# Patient Record
Sex: FEMALE | NOT HISPANIC OR LATINO | URBAN - METROPOLITAN AREA
[De-identification: names, ages, dates, MRNs, and addresses within clinical notes are randomized per-mention and may not be internally consistent; named-entity substitution may affect disease eponyms.]

---

## 2019-05-24 ENCOUNTER — OFFICE VISIT (OUTPATIENT)
Dept: URBAN - METROPOLITAN AREA CLINIC 11 | Facility: CLINIC | Age: 69
End: 2019-05-24
Payer: MEDICARE

## 2019-05-24 DIAGNOSIS — H43.313 VITREOUS MEMBRANES AND STRANDS, BILATERAL: ICD-10-CM

## 2019-05-24 DIAGNOSIS — H40.013 OPEN ANGLE WITH BORDERLINE FINDINGS, LOW RISK, BILATERAL: ICD-10-CM

## 2019-05-24 PROCEDURE — 92014 COMPRE OPH EXAM EST PT 1/>: CPT | Performed by: OPTOMETRIST

## 2019-05-24 ASSESSMENT — INTRAOCULAR PRESSURE
OD: 16
OS: 16

## 2019-05-24 NOTE — IMPRESSION/PLAN
Impression: Age-related nuclear cataract, bilateral: H25.13. OU. Plan: Cataracts account for the patient's complaints. Discussed options, surgery or spectacle change. Explained surgery risks, benefits, procedures and recovery.   Patient defers surgery and elects to change glasses first.

## 2019-05-24 NOTE — IMPRESSION/PLAN
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 Plan: OAG suspect d/t large c/d ratio. IOP normal today. Return for baseline testing 3-4 months , including HVF 24-2, OCT-RNFL and pachymetry. No treatment at this time.

## 2019-09-06 ENCOUNTER — TESTING ONLY (OUTPATIENT)
Dept: URBAN - METROPOLITAN AREA CLINIC 11 | Facility: CLINIC | Age: 69
End: 2019-09-06
Payer: MEDICARE

## 2019-09-06 PROCEDURE — 92083 EXTENDED VISUAL FIELD XM: CPT | Performed by: OPTOMETRIST

## 2019-09-13 ENCOUNTER — OFFICE VISIT (OUTPATIENT)
Dept: URBAN - METROPOLITAN AREA CLINIC 11 | Facility: CLINIC | Age: 69
End: 2019-09-13
Payer: MEDICARE

## 2019-09-13 PROCEDURE — 92133 CPTRZD OPH DX IMG PST SGM ON: CPT | Performed by: OPTOMETRIST

## 2019-09-13 PROCEDURE — 99213 OFFICE O/P EST LOW 20 MIN: CPT | Performed by: OPTOMETRIST

## 2019-09-13 ASSESSMENT — INTRAOCULAR PRESSURE
OS: 14
OD: 15

## 2019-09-13 NOTE — IMPRESSION/PLAN
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
HVF 24-2: full field OU
OCT RNFL: large cup, RNFL WNL OU Plan: Reviewed results of testing w/pt. Continue to monitor without medication at this time. 
RTC 6 months x dilated exam.

## 2020-03-03 ENCOUNTER — OFFICE VISIT (OUTPATIENT)
Dept: URBAN - METROPOLITAN AREA CLINIC 11 | Facility: CLINIC | Age: 70
End: 2020-03-03
Payer: MEDICARE

## 2020-03-03 DIAGNOSIS — H52.4 PRESBYOPIA: ICD-10-CM

## 2020-03-03 PROCEDURE — 92014 COMPRE OPH EXAM EST PT 1/>: CPT | Performed by: OPTOMETRIST

## 2020-03-03 ASSESSMENT — KERATOMETRY
OD: 43.88
OS: 43.75

## 2020-03-03 ASSESSMENT — INTRAOCULAR PRESSURE
OS: 15
OD: 15

## 2020-03-03 ASSESSMENT — VISUAL ACUITY
OD: 20/25
OS: 20/25

## 2020-03-03 NOTE — IMPRESSION/PLAN
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
HVF 24-2: full field OU
OCT RNFL: large cup, RNFL WNL OU Plan: Monitor without drops for now.  
f/u 1yr IOP OCT

## 2021-06-08 ENCOUNTER — OFFICE VISIT (OUTPATIENT)
Dept: URBAN - METROPOLITAN AREA CLINIC 11 | Facility: CLINIC | Age: 71
End: 2021-06-08
Payer: MEDICARE

## 2021-06-08 DIAGNOSIS — H25.13 AGE-RELATED NUCLEAR CATARACT, BILATERAL: ICD-10-CM

## 2021-06-08 PROCEDURE — 92133 CPTRZD OPH DX IMG PST SGM ON: CPT | Performed by: OPTOMETRIST

## 2021-06-08 PROCEDURE — 92014 COMPRE OPH EXAM EST PT 1/>: CPT | Performed by: OPTOMETRIST

## 2021-06-08 ASSESSMENT — KERATOMETRY
OS: 43.75
OD: 43.88

## 2021-06-08 ASSESSMENT — INTRAOCULAR PRESSURE
OD: 14
OS: 14

## 2021-06-08 NOTE — IMPRESSION/PLAN
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
HVF 24-2: full field OU OCT 06/21 8/8 96/87 OCT RNFL: large cup, RNFL WNL OU Plan: OCT of RNFL ordered and performed today. RNFL normal OU. Monitor without drops for now.  
f/u 1yr IOP OCT

## 2021-06-08 NOTE — IMPRESSION/PLAN
Impression: Age-related nuclear cataract, bilateral: H25.13. OU. Plan: Discussed diagnosis in detail with patient. No treatment is required at this time. Will continue to observe condition and or symptoms. Call if [address] worsens.

## 2023-09-12 ENCOUNTER — OFFICE VISIT (OUTPATIENT)
Facility: LOCATION | Age: 73
End: 2023-09-12
Payer: MEDICARE

## 2023-09-12 DIAGNOSIS — H04.123 TEAR FILM INSUFFICIENCY OF BILATERAL LACRIMAL GLANDS: ICD-10-CM

## 2023-09-12 DIAGNOSIS — H35.363 DRUSEN (DEGENERATIVE) OF MACULA, BILATERAL: Primary | ICD-10-CM

## 2023-09-12 DIAGNOSIS — H40.013 OPEN ANGLE WITH BORDERLINE FINDINGS, LOW RISK, BILATERAL: ICD-10-CM

## 2023-09-12 DIAGNOSIS — H25.13 AGE-RELATED NUCLEAR CATARACT, BILATERAL: ICD-10-CM

## 2023-09-12 PROCEDURE — 92004 COMPRE OPH EXAM NEW PT 1/>: CPT | Performed by: OPTOMETRIST

## 2023-09-12 PROCEDURE — 92134 CPTRZ OPH DX IMG PST SGM RTA: CPT | Performed by: OPTOMETRIST

## 2023-09-12 ASSESSMENT — VISUAL ACUITY
OS: 20/20
OD: 20/20

## 2023-09-12 ASSESSMENT — INTRAOCULAR PRESSURE
OD: 13
OS: 14
OS: 13
OD: 15